# Patient Record
Sex: FEMALE | Race: WHITE | Employment: OTHER | URBAN - METROPOLITAN AREA
[De-identification: names, ages, dates, MRNs, and addresses within clinical notes are randomized per-mention and may not be internally consistent; named-entity substitution may affect disease eponyms.]

---

## 2023-08-21 ENCOUNTER — APPOINTMENT (OUTPATIENT)
Dept: GENERAL RADIOLOGY | Age: 43
End: 2023-08-21
Payer: MEDICAID

## 2023-08-21 ENCOUNTER — APPOINTMENT (OUTPATIENT)
Dept: CT IMAGING | Age: 43
End: 2023-08-21
Payer: MEDICAID

## 2023-08-21 ENCOUNTER — HOSPITAL ENCOUNTER (EMERGENCY)
Age: 43
Discharge: HOME | End: 2023-08-21
Attending: STUDENT IN AN ORGANIZED HEALTH CARE EDUCATION/TRAINING PROGRAM
Payer: MEDICAID

## 2023-08-21 VITALS
TEMPERATURE: 98.8 F | OXYGEN SATURATION: 97 % | RESPIRATION RATE: 22 BRPM | WEIGHT: 77 LBS | BODY MASS INDEX: 17.32 KG/M2 | HEIGHT: 56 IN | HEART RATE: 107 BPM | SYSTOLIC BLOOD PRESSURE: 110 MMHG | DIASTOLIC BLOOD PRESSURE: 71 MMHG

## 2023-08-21 DIAGNOSIS — G40.919 BREAKTHROUGH SEIZURE (HCC): Primary | ICD-10-CM

## 2023-08-21 LAB
ALBUMIN SERPL-MCNC: 4.9 G/DL (ref 3.5–5)
ALBUMIN/GLOB SERPL: 1.4 (ref 0.4–1.6)
ALP SERPL-CCNC: 102 U/L (ref 45–117)
ALT SERPL-CCNC: 50 U/L (ref 13–61)
ANION GAP SERPL CALC-SCNC: 12 MMOL/L (ref 2–11)
APPEARANCE UR: ABNORMAL
AST SERPL-CCNC: 49 U/L (ref 15–37)
BACTERIA URNS QL MICRO: ABNORMAL /HPF
BASOPHILS # BLD: 0 K/UL (ref 0–0.2)
BASOPHILS NFR BLD: 0 % (ref 0–2)
BILIRUB SERPL-MCNC: 0.2 MG/DL (ref 0.2–1.1)
BILIRUB UR QL: NEGATIVE
BUN SERPL-MCNC: 11 MG/DL (ref 6–23)
CALCIUM SERPL-MCNC: 9.9 MG/DL (ref 8.3–10.4)
CARBAMAZEPINE SERPL-MCNC: 8.6 UG/ML (ref 4–12)
CASTS URNS QL MICRO: 0 /LPF
CHLORIDE SERPL-SCNC: 103 MMOL/L (ref 98–107)
CO2 SERPL-SCNC: 26 MMOL/L (ref 21–32)
COLOR UR: YELLOW
CREAT SERPL-MCNC: 0.37 MG/DL (ref 0.6–1)
CRYSTALS URNS QL MICRO: 0 /LPF
DIFFERENTIAL METHOD BLD: ABNORMAL
EOSINOPHIL # BLD: 0 K/UL (ref 0–0.8)
EOSINOPHIL NFR BLD: 0 % (ref 0.5–7.8)
EPI CELLS #/AREA URNS HPF: ABNORMAL /HPF
ERYTHROCYTE [DISTWIDTH] IN BLOOD BY AUTOMATED COUNT: 12.1 % (ref 11.9–14.6)
GLOBULIN SER CALC-MCNC: 3.4 G/DL (ref 2.8–4.5)
GLUCOSE SERPL-MCNC: 107 MG/DL (ref 65–100)
GLUCOSE UR STRIP.AUTO-MCNC: NEGATIVE MG/DL
HCG UR QL: NEGATIVE
HCT VFR BLD AUTO: 43 % (ref 35.8–46.3)
HGB BLD-MCNC: 14.6 G/DL (ref 11.7–15.4)
HGB UR QL STRIP: NEGATIVE
IMM GRANULOCYTES # BLD AUTO: 0 K/UL (ref 0–0.5)
IMM GRANULOCYTES NFR BLD AUTO: 0 % (ref 0–5)
KETONES UR QL STRIP.AUTO: ABNORMAL MG/DL
LACTATE SERPL-SCNC: 2.5 MMOL/L (ref 0.4–2)
LEUKOCYTE ESTERASE UR QL STRIP.AUTO: NEGATIVE
LYMPHOCYTES # BLD: 0.7 K/UL (ref 0.5–4.6)
LYMPHOCYTES NFR BLD: 6 % (ref 13–44)
MCH RBC QN AUTO: 29.7 PG (ref 26.1–32.9)
MCHC RBC AUTO-ENTMCNC: 34 G/DL (ref 31.4–35)
MCV RBC AUTO: 87.6 FL (ref 82–102)
MONOCYTES # BLD: 0.6 K/UL (ref 0.1–1.3)
MONOCYTES NFR BLD: 5 % (ref 4–12)
MUCOUS THREADS URNS QL MICRO: 0 /LPF
NEUTS SEG # BLD: 10.2 K/UL (ref 1.7–8.2)
NEUTS SEG NFR BLD: 88 % (ref 43–78)
NITRITE UR QL STRIP.AUTO: POSITIVE
NRBC # BLD: 0 K/UL (ref 0–0.2)
OTHER OBSERVATIONS: ABNORMAL
PH UR STRIP: 6 (ref 5–9)
PLATELET # BLD AUTO: 271 K/UL (ref 150–450)
PMV BLD AUTO: 9.4 FL (ref 9.4–12.3)
POTASSIUM SERPL-SCNC: 4.4 MMOL/L (ref 3.5–5.1)
PROCALCITONIN SERPL-MCNC: 0.04 NG/ML (ref 0–0.49)
PROT SERPL-MCNC: 8.3 G/DL (ref 6.4–8.2)
PROT UR STRIP-MCNC: 30 MG/DL
RBC # BLD AUTO: 4.91 M/UL (ref 4.05–5.2)
RBC #/AREA URNS HPF: 0 /HPF
SODIUM SERPL-SCNC: 141 MMOL/L (ref 133–143)
SP GR UR REFRACTOMETRY: 1.02 (ref 1–1.02)
UROBILINOGEN UR QL STRIP.AUTO: 0.2 EU/DL (ref 0.2–1)
WBC # BLD AUTO: 11.6 K/UL (ref 4.3–11.1)
WBC URNS QL MICRO: ABNORMAL /HPF

## 2023-08-21 PROCEDURE — 81025 URINE PREGNANCY TEST: CPT

## 2023-08-21 PROCEDURE — 71046 X-RAY EXAM CHEST 2 VIEWS: CPT

## 2023-08-21 PROCEDURE — 96360 HYDRATION IV INFUSION INIT: CPT

## 2023-08-21 PROCEDURE — 80156 ASSAY CARBAMAZEPINE TOTAL: CPT

## 2023-08-21 PROCEDURE — 85025 COMPLETE CBC W/AUTO DIFF WBC: CPT

## 2023-08-21 PROCEDURE — 74018 RADEX ABDOMEN 1 VIEW: CPT

## 2023-08-21 PROCEDURE — 80053 COMPREHEN METABOLIC PANEL: CPT

## 2023-08-21 PROCEDURE — 2580000003 HC RX 258: Performed by: STUDENT IN AN ORGANIZED HEALTH CARE EDUCATION/TRAINING PROGRAM

## 2023-08-21 PROCEDURE — 84145 PROCALCITONIN (PCT): CPT

## 2023-08-21 PROCEDURE — 81001 URINALYSIS AUTO W/SCOPE: CPT

## 2023-08-21 PROCEDURE — 83605 ASSAY OF LACTIC ACID: CPT

## 2023-08-21 PROCEDURE — 99284 EMERGENCY DEPT VISIT MOD MDM: CPT

## 2023-08-21 PROCEDURE — 70450 CT HEAD/BRAIN W/O DYE: CPT

## 2023-08-21 RX ORDER — POLYETHYLENE GLYCOL 3350 17 G/17G
17 POWDER, FOR SOLUTION ORAL DAILY
Qty: 1530 G | Refills: 1 | Status: SHIPPED | OUTPATIENT
Start: 2023-08-21 | End: 2023-08-21 | Stop reason: SDUPTHER

## 2023-08-21 RX ORDER — 0.9 % SODIUM CHLORIDE 0.9 %
1000 INTRAVENOUS SOLUTION INTRAVENOUS
Status: COMPLETED | OUTPATIENT
Start: 2023-08-21 | End: 2023-08-21

## 2023-08-21 RX ORDER — POLYETHYLENE GLYCOL 3350 17 G/17G
17 POWDER, FOR SOLUTION ORAL DAILY
Qty: 1530 G | Refills: 1 | Status: SHIPPED | OUTPATIENT
Start: 2023-08-21 | End: 2023-09-20

## 2023-08-21 RX ORDER — CEPHALEXIN 500 MG/1
500 CAPSULE ORAL 2 TIMES DAILY
Qty: 14 CAPSULE | Refills: 0 | Status: SHIPPED | OUTPATIENT
Start: 2023-08-21 | End: 2023-08-21 | Stop reason: SDUPTHER

## 2023-08-21 RX ORDER — DOCUSATE SODIUM 100 MG/1
100 CAPSULE, LIQUID FILLED ORAL 3 TIMES DAILY PRN
Qty: 20 CAPSULE | Refills: 0 | Status: SHIPPED | OUTPATIENT
Start: 2023-08-21 | End: 2023-08-21 | Stop reason: ALTCHOICE

## 2023-08-21 RX ORDER — CEPHALEXIN 500 MG/1
500 CAPSULE ORAL 2 TIMES DAILY
Qty: 14 CAPSULE | Refills: 0 | Status: SHIPPED | OUTPATIENT
Start: 2023-08-21 | End: 2023-08-21 | Stop reason: ALTCHOICE

## 2023-08-21 RX ORDER — DOCUSATE SODIUM 100 MG/1
100 CAPSULE, LIQUID FILLED ORAL 3 TIMES DAILY PRN
Qty: 20 CAPSULE | Refills: 0 | Status: SHIPPED | OUTPATIENT
Start: 2023-08-21 | End: 2023-08-21 | Stop reason: SDUPTHER

## 2023-08-21 RX ORDER — HYOSCYAMINE SULFATE 0.12 MG/1
1 TABLET SUBLINGUAL 3 TIMES DAILY PRN
Qty: 60 EACH | Refills: 2 | Status: SHIPPED | OUTPATIENT
Start: 2023-08-21

## 2023-08-21 RX ORDER — HYOSCYAMINE SULFATE 0.12 MG/1
1 TABLET SUBLINGUAL 3 TIMES DAILY PRN
Qty: 60 EACH | Refills: 2 | Status: SHIPPED | OUTPATIENT
Start: 2023-08-21 | End: 2023-08-21 | Stop reason: SDUPTHER

## 2023-08-21 RX ORDER — CEPHALEXIN 250 MG/5ML
500 POWDER, FOR SUSPENSION ORAL 2 TIMES DAILY
Qty: 140 ML | Refills: 0 | Status: SHIPPED | OUTPATIENT
Start: 2023-08-21 | End: 2023-08-28

## 2023-08-21 RX ADMIN — SODIUM CHLORIDE 1000 ML: 9 INJECTION, SOLUTION INTRAVENOUS at 16:17

## 2023-08-21 ASSESSMENT — PAIN - FUNCTIONAL ASSESSMENT: PAIN_FUNCTIONAL_ASSESSMENT: NONE - DENIES PAIN

## 2023-08-21 NOTE — ED PROVIDER NOTES
Emergency Department Provider Note       PCP: Gonzales Meyers MD   Age: 38 y.o. Sex: female     DISPOSITION Decision To Discharge 08/21/2023 05:11:30 PM       ICD-10-CM    1. Breakthrough seizure ([address])  G40.919 [address] [facility] Neurology [facility]          Medical Decision Making     Complexity of Problems Addressed:  1 or more chronic illnesses with a severe exacerbation or progression. 1 or more acute illnesses that pose a threat to life or bodily function. Data Reviewed and Analyzed:  I independently ordered and reviewed each unique test.  I reviewed external records: ED visit note from an outside group. I reviewed external records: provider visit note from PCP. I reviewed external records: provider visit note from outside specialist.   The patients assessment required an independent historian: Patient's mother at bedside. The reason they were needed is important historical information not provided by the patient. I interpreted the X-rays chest x-ray unremarkable, constipation present on KUB. I interpreted the CT Scan no ICH. Discussion of management or test interpretation. 53-year-old female patient with history of spastic cerebral palsy presenting this department after witnessed seizure-like episode earlier today. Patient has history of seizures, treated with Tegretol. She reports that she \"feels like crap\". She is unable to expand on her symptoms otherwise. She does report some abdominal discomfort prior to her seizure which is now resolved. She reports ongoing headache following the event as well. Patient arrives with significant tachycardia 125, remainder vital signs are stable. She denies pain at present and reports no nausea or vomiting. We will check basic labs, obtain CT and chest x-ray as a precaution given patient's ongoing tachycardia. Patient's lab work overall appears stable. Her tachycardia has improved significantly with fluids.   Her white count is minimally

## 2023-08-21 NOTE — DISCHARGE INSTRUCTIONS
Fill and take the Keflex as prescribed to run out of medication. Drink plenty clear liquids to ensure hydration. You have been prescribed several options to treat constipation and abdominal cramping. You may continue with glycerin suppositories if they facilitate regular bowel movements, use these other options if constipation worsens. Return for worsening symptoms, concerns or questions    In addition you have been referred to outpatient neurology as discussed.

## 2023-08-21 NOTE — ED TRIAGE NOTES
Pt reports that she had a seizure at 1340. Has been taking medications, pt reports that she thinks it might be a UTI.

## 2024-08-05 ENCOUNTER — OFFICE VISIT (OUTPATIENT)
Dept: NEUROLOGY | Age: 44
End: 2024-08-05
Payer: MEDICAID

## 2024-08-05 VITALS
BODY MASS INDEX: 20.24 KG/M2 | HEIGHT: 56 IN | WEIGHT: 90 LBS | DIASTOLIC BLOOD PRESSURE: 70 MMHG | SYSTOLIC BLOOD PRESSURE: 120 MMHG

## 2024-08-05 DIAGNOSIS — G40.909 SEIZURE DISORDER (HCC): ICD-10-CM

## 2024-08-05 DIAGNOSIS — Z71.9 HEALTH EDUCATION/COUNSELING: ICD-10-CM

## 2024-08-05 DIAGNOSIS — Z79.899 ENCOUNTER FOR MEDICATION MANAGEMENT: ICD-10-CM

## 2024-08-05 DIAGNOSIS — G80.0 SPASTIC QUADRIPLEGIC CEREBRAL PALSY (HCC): Primary | ICD-10-CM

## 2024-08-05 PROCEDURE — 99215 OFFICE O/P EST HI 40 MIN: CPT | Performed by: PSYCHIATRY & NEUROLOGY

## 2024-08-05 RX ORDER — CARBAMAZEPINE 200 MG/1
200 TABLET ORAL 3 TIMES DAILY
Qty: 270 TABLET | Refills: 3 | Status: SHIPPED | OUTPATIENT
Start: 2024-08-05 | End: 2025-08-05

## 2024-08-05 NOTE — PROGRESS NOTES
[facility] NEUROLOGY NOTE    Patient: Dafne Ruiz  Physician: Pawel Fraga MD    CC:   Chief Complaint   Patient presents with    New Patient     Pt is on Tegretol and has been since she was  16. She had a seizure about 6 months ago, but hasn't had one in years      PCP: Ottoniel Brown MD   Referring Provider: Oneil Abdi, *     History of Present Illness:     Dafne Ruiz is a 43 y.o. female with PMH of cerebral palsy, spastic quadriparesis, history of epilepsy (well-controlled on carbamazepine 200 mg 3 times daily), recent breakthrough seizure last occurred in August 2023 due to UTI.  Patient also has an enhanced startle reflex which is provoked the seizures, per mother.  Seizures are well-controlled, she has a normal routine.    She likes to paint using her arms, sometimes feeling pain in the wrists, visibly contracted.  Legs are also stiff, some range of motion in the knees and ankles.  No debilitating pain, some discomfort.  Very stiff trapezius muscles and some neck pain and occasional tension-like headaches, although does not complain much about this.     Able to respond verbally, reports that she does not want to start certain medications, wants to stay on Tegretol.  Last level Tegretol was in therapeutic range. Sodium has been well-controlled, no evidence for hyponatremia.    Prior relevant documentation:  12/14/2017  HPI: This is a right handed 37 y.o. Single female that suffers from CP that left her in a wheel chair and unable to ambulate with bilat upper and lower ext changes. She started with sz in 16 yrs old after the spine surgery. Her last sz was in 2006 and it happens when she is sick and can not keep meds down. She has only been on the tegretol. No other medications. She is stable and has a boy friend Wil. She has had a spinal surgery and hip surgery with tendon releases.       Review of Systems:   All systems were reviewed and relevant findings are addressed in the history

## 2024-08-06 ENCOUNTER — TELEPHONE (OUTPATIENT)
Dept: NEUROLOGY | Age: 44
End: 2024-08-06

## 2024-08-08 DIAGNOSIS — G40.909 SEIZURE DISORDER (HCC): ICD-10-CM

## 2024-08-08 RX ORDER — CARBAMAZEPINE 100 MG/1
200 TABLET, CHEWABLE ORAL 3 TIMES DAILY
Qty: 540 TABLET | Refills: 3 | Status: SHIPPED | OUTPATIENT
Start: 2024-08-08 | End: 2025-08-08

## 2025-02-12 ENCOUNTER — TELEPHONE (OUTPATIENT)
Dept: NEUROLOGY | Age: 45
End: 2025-02-12

## 2025-02-12 NOTE — TELEPHONE ENCOUNTER
[facility] health Dr. Amy Schaeffer \"Is there an alternative anti seizure medication? Could be done temporally until pt finishes the Hep C medications and go back on her old medication when completed? The Carbamzepine that the pt takes now reduces the levels of Epclusa medication due to the interactions.\" Pt has not started the medication yet waiting for your recommendations.     [phone]

## 2025-02-24 ENCOUNTER — TELEPHONE (OUTPATIENT)
Dept: NEUROLOGY | Age: 45
End: 2025-02-24

## 2025-02-24 DIAGNOSIS — G40.909 SEIZURE DISORDER (HCC): Primary | ICD-10-CM

## 2025-02-24 RX ORDER — LEVETIRACETAM 100 MG/ML
500 SOLUTION ORAL 2 TIMES DAILY
Qty: 300 ML | Refills: 11 | Status: SHIPPED | OUTPATIENT
Start: 2025-02-24 | End: 2026-02-24

## 2025-02-24 NOTE — TELEPHONE ENCOUNTER
Discussed patient's care with her mother over the phone today.    I had explained that she had doctors wish to use medication which requires us to taper away and switch from using Tegretol or other enzyme inducing antiseizure medications.  She is apprehensive about the decision to allow her Tegretol, given the fact that patient had 2 breakthrough seizures in the last year, however in the setting of UTI and other provoking factors.    We have agreed to bridge the patient to another medication as a replacement, this would be Keppra.    Plan:  Week 1:   - Start Keppra 500 mg a.m. / 500 mg p.m. (twice a day)  - Carbamazepine 200 mg a.m. / 200 mg p.m. (eliminate lunch-time dose)    Week 2:  - Continue Keppra 500 mg a.m. / 500 mg p.m.;   - Carbamazepine 100 mg a.m. / 100 mg p.m.    Week 3:  - Continue Keppra 500 mg a.m. / 500 mg p.m.;   - Carbamazepine only take 100 mg p.m. (night time only)    Week 4:  - Continue Keppra 500 mg a.m. / 500 mg p.m.;   - No more Carbamazepine  - Can start Epclusa       We have agreed on this tentative plan, Rx sent.  We will inform GI about these changes.

## 2025-07-07 ENCOUNTER — OFFICE VISIT (OUTPATIENT)
Dept: NEUROLOGY | Age: 45
End: 2025-07-07
Payer: MEDICAID

## 2025-07-07 ENCOUNTER — TELEPHONE (OUTPATIENT)
Dept: NEUROLOGY | Age: 45
End: 2025-07-07

## 2025-07-07 VITALS — DIASTOLIC BLOOD PRESSURE: 63 MMHG | SYSTOLIC BLOOD PRESSURE: 120 MMHG

## 2025-07-07 DIAGNOSIS — G80.0 SPASTIC QUADRIPLEGIC CEREBRAL PALSY (HCC): Primary | ICD-10-CM

## 2025-07-07 DIAGNOSIS — M24.549 CONTRACTURE OF HAND: ICD-10-CM

## 2025-07-07 DIAGNOSIS — G40.909 SEIZURE DISORDER (HCC): ICD-10-CM

## 2025-07-07 PROCEDURE — 99215 OFFICE O/P EST HI 40 MIN: CPT | Performed by: PSYCHIATRY & NEUROLOGY

## 2025-07-07 RX ORDER — BACLOFEN 5 MG/5ML
5 SOLUTION ORAL 2 TIMES DAILY
Qty: 60 ML | Refills: 5 | Status: SHIPPED | OUTPATIENT
Start: 2025-07-07 | End: 2026-01-03

## 2025-07-07 RX ORDER — LEVETIRACETAM 100 MG/ML
500 SOLUTION ORAL 2 TIMES DAILY
Qty: 300 ML | Refills: 11 | Status: SHIPPED | OUTPATIENT
Start: 2025-07-07 | End: 2026-07-07

## 2025-07-07 NOTE — TELEPHONE ENCOUNTER
Christopher from [facility] and said they received your referral but at this time they will have to deny the referral for Occupational Therapy because they do not go to pt's home alone without nursing or physical therapy. She said if you think pt will benefit from physical therapy then you can resend referral for both OT and PT and they can accept.

## 2025-07-07 NOTE — PROGRESS NOTES
Feeling of Stress : Not at all   Social Connections: Socially Isolated (3/12/2025)    Received from [facility]    Social Connections     Frequency of Communication with Friends and Family: Never     Frequency of Social Gatherings with Friends and Family: Never   Intimate Partner Violence: Not At Risk (3/12/2025)    Received from [facility]    Intimate Partner Violence     Fear of Current or Ex-Partner: No     Emotionally Abused: No     Physically Abused: No     Sexually Abused: No   Housing Stability: Not At Risk (3/12/2025)    Received from [facility]    Housing Stability     Was there a time when you did not have a steady place to sleep: No     Worried that the place you are staying is making you sick: No         Medications/Allergies:   MEDICATIONS:   Outpatient Encounter Medications as of 7/7/2025   Medication Sig Dispense Refill    levETIRAcetam (KEPPRA) 100 MG/ML oral solution Take 5 mLs by mouth 2 times daily 300 mL 11    baclofen (LIORESAL) 5 MG/5ML SOLN Take 1 mL by mouth 2 times daily 60 mL 5    [DISCONTINUED] levETIRAcetam (KEPPRA) 100 MG/ML oral solution Take 5 mLs by mouth 2 times daily 300 mL 11    [DISCONTINUED] carBAMazepine (TEGRETOL) 100 MG chewable tablet Take 2 tablets by mouth 3 times daily (Patient not taking: Reported on 7/7/2025) 540 tablet 3    [DISCONTINUED] Cholecalciferol (PA VITAMIN D-3 GUMMY PO) Take 10 mLs by mouth daily (Patient not taking: Reported on 7/7/2025)      [DISCONTINUED] carBAMazepine (TEGRETOL) 200 MG tablet Take 1 tablet by mouth in the morning, at noon, and at bedtime (Patient not taking: Reported on 7/7/2025) 270 tablet 3     No facility-administered encounter medications on file as of 7/7/2025.       ALLERGIES:  No Known Allergies    Physical Exam:   /63 (Patient Position: Sitting)     Physical and Neurological Exam  General: no acute distress, cooperative  HEENT: normocephalic, anicteric sclerae, MMM  Cardiovascular: RRR  Respiratory: good air entry

## 2025-07-08 ENCOUNTER — TELEPHONE (OUTPATIENT)
Dept: NEUROLOGY | Age: 45
End: 2025-07-08

## 2025-07-08 NOTE — TELEPHONE ENCOUNTER
Yumiko, patient's mother and caregiver LVM stating that they were unable to get the liquid Baclofen from the pharmacy because of patient's age. They are requesting a pre approval from us stating that it is medically necessary because patient has Cerebral Palsy.     I called the pharmacy and spoke with the pharmacist who let me know there was a glitch in their system and that she is currently working on trying to get the approved and should have it ready for patient to  by 5:30pm today.     I called patient's mother Yumiko back and let her know what the pharmacist said, she understood and is to call us back should there be any more issues.

## 2025-07-08 NOTE — TELEPHONE ENCOUNTER
PA for Baclofen Suspension approved effective from 07/08/2025 until 07/08/2026.    PA Case# [id number]    Pharmacy informed.